# Patient Record
Sex: FEMALE | Race: WHITE | NOT HISPANIC OR LATINO | URBAN - METROPOLITAN AREA
[De-identification: names, ages, dates, MRNs, and addresses within clinical notes are randomized per-mention and may not be internally consistent; named-entity substitution may affect disease eponyms.]

---

## 2023-04-25 PROBLEM — D22.9 ATYPICAL MOLE: Status: ACTIVE | Noted: 2023-04-25

## 2023-04-25 PROBLEM — R20.2 TINGLING OF RIGHT UPPER EXTREMITY: Status: ACTIVE | Noted: 2023-04-25

## 2023-04-25 PROBLEM — R19.5 POSITIVE COLORECTAL CANCER SCREENING USING COLOGUARD TEST: Status: ACTIVE | Noted: 2023-04-25

## 2023-04-25 PROBLEM — R20.2 PARESTHESIA OF RIGHT ARM: Status: ACTIVE | Noted: 2023-04-25

## 2023-05-01 ENCOUNTER — OFFICE VISIT (OUTPATIENT)
Dept: PRIMARY CARE | Facility: CLINIC | Age: 53
End: 2023-05-01
Payer: COMMERCIAL

## 2023-05-01 VITALS
HEIGHT: 62 IN | BODY MASS INDEX: 27.23 KG/M2 | HEART RATE: 70 BPM | SYSTOLIC BLOOD PRESSURE: 120 MMHG | WEIGHT: 148 LBS | OXYGEN SATURATION: 97 % | DIASTOLIC BLOOD PRESSURE: 77 MMHG

## 2023-05-01 DIAGNOSIS — R52 PAIN: ICD-10-CM

## 2023-05-01 DIAGNOSIS — Z12.31 ENCOUNTER FOR SCREENING MAMMOGRAM FOR MALIGNANT NEOPLASM OF BREAST: ICD-10-CM

## 2023-05-01 DIAGNOSIS — Z00.00 WELL ADULT EXAM: Primary | ICD-10-CM

## 2023-05-01 DIAGNOSIS — L91.8 SKIN TAG, ACQUIRED: ICD-10-CM

## 2023-05-01 PROBLEM — R19.5 POSITIVE COLORECTAL CANCER SCREENING USING COLOGUARD TEST: Status: RESOLVED | Noted: 2023-04-25 | Resolved: 2023-05-01

## 2023-05-01 PROCEDURE — 99396 PREV VISIT EST AGE 40-64: CPT | Performed by: FAMILY MEDICINE

## 2023-05-01 PROCEDURE — 90715 TDAP VACCINE 7 YRS/> IM: CPT | Performed by: FAMILY MEDICINE

## 2023-05-01 PROCEDURE — 90471 IMMUNIZATION ADMIN: CPT | Performed by: FAMILY MEDICINE

## 2023-05-01 PROCEDURE — 1036F TOBACCO NON-USER: CPT | Performed by: FAMILY MEDICINE

## 2023-05-01 ASSESSMENT — PATIENT HEALTH QUESTIONNAIRE - PHQ9
2. FEELING DOWN, DEPRESSED OR HOPELESS: NOT AT ALL
SUM OF ALL RESPONSES TO PHQ9 QUESTIONS 1 AND 2: 0
1. LITTLE INTEREST OR PLEASURE IN DOING THINGS: NOT AT ALL

## 2023-05-01 NOTE — PROGRESS NOTES
"  Subjective   Patient ID: Radha Pennington is a 53 y.o. female who presents for Annual Exam (Yearly physical, last pap 2021).  Last wellness visit 2 years ago    Past Medical, Surgical, Social and Family Hx reviewed-Yes    Any acute complaints?    Skin tag on her abdomen, gets caught on clothing    Any chronic issues addressed today or Rx refills needed?    No  She says she does not want/need treatment for the restless legs    Last pap 2019 ascus  Last Mammogram 2021  Colon CA screening Hx 2022  Labs Reviewed from 2021 and 2020  Up to date on immunizations yes, needs TDaP this year  Dentist in the past year yes    Menstruation irregular, now every 4-6 months  Sexual Activity no concerns        PE:  /77   Pulse 70   Ht 1.568 m (5' 1.75\")   Wt 67.1 kg (148 lb)   SpO2 97%   BMI 27.29 kg/m²   Alert adult woman, NAD  HEENT clear  Neck supple, No LAD  Heart RRR no murmur  Lungs CTA bilat  Abdomen benign, normal BS, soft NT/ND  Skin warm, dry, clear      Large pedunculated skin tag left upper abdomen below bra.      Flat brown papule on forehead at hairline (has been checked by Derm twice)  Neuro grossly normal, gait WNL  Affect pleasant and appropriate, memory and judgement WNL        Assessment/Plan   Problem List Items Addressed This Visit    None  Visit Diagnoses       Well adult exam    -  Primary    Skin tag, acquired        Pain        Encounter for screening mammogram for malignant neoplasm of breast        Relevant Orders    BI mammo bilateral screening tomosynthesis          She will return for removal of skin tag at her convenience.  Reviewed vaccines and HM     "

## 2023-05-18 ENCOUNTER — PROCEDURE VISIT (OUTPATIENT)
Dept: PRIMARY CARE | Facility: CLINIC | Age: 53
End: 2023-05-18
Payer: COMMERCIAL

## 2023-05-18 VITALS
HEART RATE: 60 BPM | SYSTOLIC BLOOD PRESSURE: 112 MMHG | BODY MASS INDEX: 27.1 KG/M2 | WEIGHT: 147 LBS | OXYGEN SATURATION: 97 % | DIASTOLIC BLOOD PRESSURE: 74 MMHG

## 2023-05-18 DIAGNOSIS — L91.8 SKIN TAG, ACQUIRED: Primary | ICD-10-CM

## 2023-05-18 DIAGNOSIS — R52 PAIN: ICD-10-CM

## 2023-05-18 PROCEDURE — 11200 RMVL SKIN TAGS UP TO&INC 15: CPT | Performed by: FAMILY MEDICINE

## 2023-06-04 NOTE — PROGRESS NOTES
Subjective   Patient ID: Radha Pennington is a 53 y.o. female who presents for Skin Tag (Skin tag removal).  She has 1 very large skin tag on her left upper abdomen just below her bra which catches and causes pain.  She has 2 smaller tags on her trunk also would like removed.    Histories reviewed      Objective   /74   Pulse 60   Wt 66.7 kg (147 lb)   SpO2 97%   BMI 27.10 kg/m²    Physical Exam    Alert adult woman, no acute distress  Skin warm and dry  Large pedunculated skin tag under her left breast, normal skin color, not hyperpigmented, no sign that this is anything other than skin tag.  She has 2 smaller lesions on sternum and upper L abdomen.    Procedure note  Verbal informed consent obtained including risk benefits, possible complications  Each lesion Anesthetized with 2% lidocaine  Removed lesions with a sterile scalpel, no pathology sent  Electrocautery used for hemostasis  Patient tolerated well, bandage applied    Problem List Items Addressed This Visit    None  Visit Diagnoses       Skin tag, acquired    -  Primary    Pain            Skin tag removed as above  Wound care reviewed  Follow-up as needed

## 2023-08-24 ENCOUNTER — PROCEDURE VISIT (OUTPATIENT)
Dept: PRIMARY CARE | Facility: CLINIC | Age: 53
End: 2023-08-24
Payer: COMMERCIAL

## 2023-08-24 VITALS
OXYGEN SATURATION: 95 % | DIASTOLIC BLOOD PRESSURE: 91 MMHG | SYSTOLIC BLOOD PRESSURE: 143 MMHG | HEART RATE: 83 BPM | WEIGHT: 150 LBS | BODY MASS INDEX: 27.66 KG/M2

## 2023-08-24 DIAGNOSIS — Q82.8 ACCESSORY SKIN TAGS: Primary | ICD-10-CM

## 2023-08-24 DIAGNOSIS — R52 PAIN: ICD-10-CM

## 2023-08-24 PROCEDURE — 99212 OFFICE O/P EST SF 10 MIN: CPT | Performed by: FAMILY MEDICINE

## 2023-08-24 PROCEDURE — 11200 RMVL SKIN TAGS UP TO&INC 15: CPT | Performed by: FAMILY MEDICINE

## 2023-09-07 NOTE — PROGRESS NOTES
Subjective   Patient ID: Radha Pennington is a 53 y.o. female who presents for mole/skin tag removal.  2 of the skin tags cause pain when rubbed, one on Inner thigh and 1 of the 2 on her back.  No other sxs.  She has had skin tag removal in the past and it went well.  She is fine with not sending lesions for pathology if I believe they appear completely benign.    Histories reviewed      Objective   BP (!) 143/91   Pulse 83   Wt 68 kg (150 lb)   SpO2 95%   BMI 27.66 kg/m²    Physical Exam  Alert adult woman, NAD  Affect pleasant  Skin warm and dry    She has large skin tags on her mid back and 1 on her inner thigh, all less than 1/2 cm in diameter      Procedure note  Verbal informed consent obtained  Each of the 3 areas was anesthetized with 2% lidocaine  Each of the skin tags was removed with a sterile 10 blade scalpel and electrocautery was used for hemostasis.  Patient tolerated well with minimal bleeding and pain.  Band-Aids applied.  Wound care reviewed.  No specimens were sent for pathology    Problem List Items Addressed This Visit    None  Visit Diagnoses       Accessory skin tags    -  Primary    Pain

## 2025-01-22 ENCOUNTER — APPOINTMENT (OUTPATIENT)
Dept: PRIMARY CARE | Facility: CLINIC | Age: 55
End: 2025-01-22
Payer: COMMERCIAL

## 2025-01-23 ENCOUNTER — APPOINTMENT (OUTPATIENT)
Dept: PRIMARY CARE | Facility: CLINIC | Age: 55
End: 2025-01-23
Payer: COMMERCIAL

## 2025-01-23 VITALS
HEIGHT: 62 IN | HEART RATE: 83 BPM | WEIGHT: 152 LBS | DIASTOLIC BLOOD PRESSURE: 66 MMHG | SYSTOLIC BLOOD PRESSURE: 120 MMHG | OXYGEN SATURATION: 98 % | BODY MASS INDEX: 27.97 KG/M2

## 2025-01-23 DIAGNOSIS — Z12.4 SCREENING FOR CERVICAL CANCER: ICD-10-CM

## 2025-01-23 DIAGNOSIS — Z13.220 SCREENING FOR HYPERLIPIDEMIA: ICD-10-CM

## 2025-01-23 DIAGNOSIS — Z12.31 ENCOUNTER FOR SCREENING MAMMOGRAM FOR MALIGNANT NEOPLASM OF BREAST: ICD-10-CM

## 2025-01-23 DIAGNOSIS — Z01.419 WELL WOMAN EXAM: Primary | ICD-10-CM

## 2025-01-23 DIAGNOSIS — Z13.1 SCREENING FOR DIABETES MELLITUS: ICD-10-CM

## 2025-01-23 PROCEDURE — 80061 LIPID PANEL: CPT

## 2025-01-23 PROCEDURE — 87624 HPV HI-RISK TYP POOLED RSLT: CPT

## 2025-01-23 PROCEDURE — 3008F BODY MASS INDEX DOCD: CPT | Performed by: FAMILY MEDICINE

## 2025-01-23 PROCEDURE — 99396 PREV VISIT EST AGE 40-64: CPT | Performed by: FAMILY MEDICINE

## 2025-01-23 PROCEDURE — 1036F TOBACCO NON-USER: CPT | Performed by: FAMILY MEDICINE

## 2025-01-23 PROCEDURE — 83036 HEMOGLOBIN GLYCOSYLATED A1C: CPT

## 2025-01-23 ASSESSMENT — PATIENT HEALTH QUESTIONNAIRE - PHQ9
2. FEELING DOWN, DEPRESSED OR HOPELESS: NOT AT ALL
1. LITTLE INTEREST OR PLEASURE IN DOING THINGS: NOT AT ALL
SUM OF ALL RESPONSES TO PHQ9 QUESTIONS 1 AND 2: 0

## 2025-01-23 NOTE — PROGRESS NOTES
"  Subjective   Patient ID: Radha Pennington is a 54 y.o. female who presents for Annual Exam (Physical /Would like PAP, last one was 2021-normal./No other concerns.). She really wants a pap today, 2020 was abnormal      Past Medical, Surgical, Social and Family Hx reviewed-Yes    Any acute complaints?    Nothing she needs help with    Any chronic issues addressed today or Rx refills needed?    none    Last pap 2021, repeat today  Last Mammogram 2023  Colon CA screening Hx 2021 due again in 2026  Labs reviewed 2021, ordered  Up to date on immunizations yes, except for flu/covid, refuses  Dentist in the past year needs to go    Menstruation LMP 6 months ago  Sexual Activity yes, no concerns        PE:  /66   Pulse 83   Ht 1.562 m (5' 1.5\")   Wt 68.9 kg (152 lb)   SpO2 98%   BMI 28.26 kg/m²   Alert adult woman, NAD  HEENT clear  Neck supple, No LAD  Heart RRR no murmur  Lungs CTA bilat  Abdomen benign, normal BS, soft NT/ND  Skin warm, dry, clear  Neuro grossly normal, gait WNL  Affect pleasant and appropriate, memory and judgement WNL      Breasts within normal limits, no masses, axilla negative   normal external female, no rash or lesions, cervix appears normal      Assessment/Plan   Assessment & Plan  Well woman exam  Reviewed HM and vaccines          Screening for cervical cancer    Orders:    THINPREP PAP TEST    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

## 2025-01-24 LAB
CHOLEST SERPL-MCNC: 245 MG/DL (ref 0–199)
CHOLESTEROL/HDL RATIO: 2.9
EST. AVERAGE GLUCOSE BLD GHB EST-MCNC: 108 MG/DL
HBA1C MFR BLD: 5.4 %
HDLC SERPL-MCNC: 85.2 MG/DL
LDLC SERPL CALC-MCNC: 143 MG/DL
NON HDL CHOLESTEROL: 160 MG/DL (ref 0–149)
TRIGL SERPL-MCNC: 86 MG/DL (ref 0–149)
VLDL: 17 MG/DL (ref 0–40)

## 2025-02-06 LAB
CYTOLOGY CMNT CVX/VAG CYTO-IMP: NORMAL
HPV HR 12 DNA GENITAL QL NAA+PROBE: NEGATIVE
HPV HR GENOTYPES PNL CVX NAA+PROBE: NEGATIVE
HPV16 DNA SPEC QL NAA+PROBE: NEGATIVE
HPV18 DNA SPEC QL NAA+PROBE: NEGATIVE
LAB AP HPV GENOTYPE QUESTION: YES
LAB AP HPV HR: NORMAL
LABORATORY COMMENT REPORT: NORMAL
PATH REPORT.TOTAL CANCER: NORMAL

## 2025-03-07 ENCOUNTER — HOSPITAL ENCOUNTER (OUTPATIENT)
Dept: RADIOLOGY | Facility: CLINIC | Age: 55
Discharge: HOME | End: 2025-03-07
Payer: MEDICARE

## 2025-03-07 VITALS — HEIGHT: 62 IN | BODY MASS INDEX: 27.97 KG/M2 | WEIGHT: 152 LBS

## 2025-03-07 DIAGNOSIS — Z12.31 ENCOUNTER FOR SCREENING MAMMOGRAM FOR MALIGNANT NEOPLASM OF BREAST: ICD-10-CM

## 2025-03-07 PROCEDURE — 77063 BREAST TOMOSYNTHESIS BI: CPT | Performed by: RADIOLOGY

## 2025-03-07 PROCEDURE — 77067 SCR MAMMO BI INCL CAD: CPT | Performed by: RADIOLOGY

## 2025-03-07 PROCEDURE — 77067 SCR MAMMO BI INCL CAD: CPT

## 2025-06-26 ENCOUNTER — PATIENT OUTREACH (OUTPATIENT)
Dept: PRIMARY CARE | Facility: CLINIC | Age: 55
End: 2025-06-26
Payer: MEDICARE

## 2025-06-26 NOTE — PROGRESS NOTES
Discharge Facility:[facility]  Discharge Diagnosis:Hypokalemia  Admission Date:6/23/25  Discharge Date: 6/25/25    PCP Appointment Date:No contact made. Message sent to office  Specialist Appointment Date: TBD  Hospital Encounter and Summary Linked: Yes      Hospital Encounter with Gianfranco Daniel DO; Ravi Chung MD; Asher Guadarrama MD; Marixa Palacios MD; Madelyn Amado DO     Two attempts were made to reach patient within two business days after discharge. Left voicemail with contact information for patient to call back with any non-emergent questions or concerns.

## 2025-07-08 ENCOUNTER — APPOINTMENT (OUTPATIENT)
Dept: PRIMARY CARE | Facility: CLINIC | Age: 55
End: 2025-07-08
Payer: MEDICARE

## 2025-07-08 VITALS
DIASTOLIC BLOOD PRESSURE: 60 MMHG | BODY MASS INDEX: 26.73 KG/M2 | OXYGEN SATURATION: 96 % | WEIGHT: 143.8 LBS | HEART RATE: 83 BPM | SYSTOLIC BLOOD PRESSURE: 104 MMHG

## 2025-07-08 DIAGNOSIS — E87.6 HYPOKALEMIA: Primary | ICD-10-CM

## 2025-07-08 DIAGNOSIS — R74.8 ELEVATED LIPASE: ICD-10-CM

## 2025-07-08 ASSESSMENT — PATIENT HEALTH QUESTIONNAIRE - PHQ9
1. LITTLE INTEREST OR PLEASURE IN DOING THINGS: NOT AT ALL
2. FEELING DOWN, DEPRESSED OR HOPELESS: NOT AT ALL
SUM OF ALL RESPONSES TO PHQ9 QUESTIONS 1 AND 2: 0

## 2025-07-10 ENCOUNTER — PATIENT OUTREACH (OUTPATIENT)
Dept: PRIMARY CARE | Facility: CLINIC | Age: 55
End: 2025-07-10
Payer: MEDICARE

## 2025-07-20 NOTE — PROGRESS NOTES
"Patient: Radha Pennington  : 1970  PCP: Ida Amado MD  MRN: [id number]  Program: Transitional Care Management  Status: Enrolled  Effective Dates: 2025 - present  Responsible Staff: Mya Phipps LPN  Social Drivers to be Addressed: Alcohol Use, Financial Resource Strain, Physical Activity, Social Connections, Stress, Tobacco Use, Transportation Needs         Radha Pennington is a 55 y.o. female presenting today for follow-up after being discharged from the hospital 13 days ago. The main problem requiring admission was hypokalemia. The discharge summary and/or Transitional Care Management documentation was reviewed. Medication reconciliation was performed as indicated via the \"Dion as Reviewed\" timestamp.     From admission H&P  HPI:  This is a 55 year old female without significant medical problems.  She developed onset of nausea and vomiting 3 days ago after drinking a small amount of alcohol.  She denies diarrhea, abdominal pain, fever, chills, and no use of THC, no recent travel, and no restaurant food or exposure to anyone else that has been recently sick with GI symptoms.  Yesterday she began feeling more weak, and lethargic.  Last evening and this morning she had some tingling in her shoulders and upper extremities.  She presented to the [facility] ED, and was found to have daily vital signs.  Labs revealed hypokalemia of 2.6, mild lipase elevation of 92, and leukocytosis of 16.82.  She received IV hydration and IV potassium, and was admitted to the CDU for continued IV hydration and treatment of persistent emesis with hypokalemia.    Radha Pennington was contacted by Transitional Care Management services two days after her discharge. This encounter and supporting documentation was reviewed.    Review of Systems    /60   Pulse 83   Wt 65.2 kg (143 lb 12.8 oz)   LMP 2020   SpO2 96%   BMI 26.73 kg/m²     Physical Exam    Alert adult, NAD  Affect pleasant  Heart RRR no murmur  Lungs CTA " bilat  Abdomen [facility] labs reviewed    The complexity of medical decision making for this patient's transitional care is moderate.    Assessment & Plan  Hypokalemia  I assume the lab abnormalities were due to the severe vomiting, but need to recheck labs  Orders:    Basic metabolic panel; Future    Elevated lipase    Orders:    Lipase; Future